# Patient Record
Sex: FEMALE | Race: WHITE | NOT HISPANIC OR LATINO | Employment: OTHER | URBAN - METROPOLITAN AREA
[De-identification: names, ages, dates, MRNs, and addresses within clinical notes are randomized per-mention and may not be internally consistent; named-entity substitution may affect disease eponyms.]

---

## 2022-02-22 ENCOUNTER — TRANSCRIBE ORDERS (OUTPATIENT)
Dept: ADMINISTRATIVE | Facility: HOSPITAL | Age: 67
End: 2022-02-22

## 2022-02-22 DIAGNOSIS — C50.011 MALIGNANT NEOPLASM OF NIPPLE OF RIGHT BREAST IN FEMALE, UNSPECIFIED ESTROGEN RECEPTOR STATUS: Primary | ICD-10-CM

## 2022-03-14 ENCOUNTER — APPOINTMENT (OUTPATIENT)
Dept: OTHER | Facility: HOSPITAL | Age: 67
End: 2022-03-14
Payer: MEDICARE

## 2022-03-14 ENCOUNTER — HOSPITAL ENCOUNTER (OUTPATIENT)
Dept: MRI IMAGING | Facility: HOSPITAL | Age: 67
Discharge: HOME | End: 2022-03-14
Payer: MEDICARE

## 2022-03-14 DIAGNOSIS — Z09 FOLLOW UP: ICD-10-CM

## 2022-03-14 DIAGNOSIS — C50.011 MALIGNANT NEOPLASM OF NIPPLE OF RIGHT BREAST IN FEMALE, UNSPECIFIED ESTROGEN RECEPTOR STATUS: ICD-10-CM

## 2022-03-14 LAB
CREAT BLDA-MCNC: 0.7 MG/DL (ref 0.6–1.3)
EGFRCR SERPLBLD CKD-EPI 2021: 96.1 ML/MIN/1.73

## 2022-03-14 PROCEDURE — A9579 GAD-BASE MR CONTRAST NOS,1ML: HCPCS | Performed by: NURSE PRACTITIONER

## 2022-03-14 PROCEDURE — 77049 MRI BREAST C-+ W/CAD BI: CPT

## 2022-03-14 PROCEDURE — 25010000002 GADOTERIDOL PER 1 ML: Performed by: NURSE PRACTITIONER

## 2022-03-14 PROCEDURE — 82565 ASSAY OF CREATININE: CPT

## 2022-03-14 RX ADMIN — GADOTERIDOL 20 ML: 279.3 INJECTION, SOLUTION INTRAVENOUS at 12:21

## 2022-12-15 ENCOUNTER — HOSPITAL ENCOUNTER (OUTPATIENT)
Facility: HOSPITAL | Age: 67
Discharge: HOME | End: 2022-12-15
Attending: EMERGENCY MEDICINE | Admitting: EMERGENCY MEDICINE
Payer: MEDICARE

## 2022-12-15 VITALS
TEMPERATURE: 98.1 F | SYSTOLIC BLOOD PRESSURE: 149 MMHG | HEIGHT: 66 IN | RESPIRATION RATE: 20 BRPM | WEIGHT: 230 LBS | BODY MASS INDEX: 36.96 KG/M2 | DIASTOLIC BLOOD PRESSURE: 84 MMHG | HEART RATE: 95 BPM | OXYGEN SATURATION: 96 %

## 2022-12-15 DIAGNOSIS — J06.9 VIRAL UPPER RESPIRATORY INFECTION: Primary | ICD-10-CM

## 2022-12-15 LAB
FLUAV SUBTYP SPEC NAA+PROBE: NOT DETECTED
FLUBV RNA ISLT QL NAA+PROBE: NOT DETECTED
SARS-COV-2 RNA RESP QL NAA+PROBE: NOT DETECTED

## 2022-12-15 PROCEDURE — 99203 OFFICE O/P NEW LOW 30 MIN: CPT | Performed by: NURSE PRACTITIONER

## 2022-12-15 PROCEDURE — 99283 EMERGENCY DEPT VISIT LOW MDM: CPT

## 2022-12-15 PROCEDURE — 87636 SARSCOV2 & INF A&B AMP PRB: CPT | Performed by: EMERGENCY MEDICINE

## 2022-12-15 PROCEDURE — G0463 HOSPITAL OUTPT CLINIC VISIT: HCPCS | Performed by: NURSE PRACTITIONER

## 2022-12-15 RX ORDER — BENZONATATE 200 MG/1
200 CAPSULE ORAL 3 TIMES DAILY PRN
Qty: 15 CAPSULE | Refills: 0 | Status: SHIPPED | OUTPATIENT
Start: 2022-12-15

## 2022-12-15 NOTE — DISCHARGE INSTRUCTIONS
Thank you for letting us care for you today.  You have tested negative for COVID and flu.  You have been given a prescription medication for your cough which you may take following package instructions.   Should you experience significant shortness of breath, chest pain, dizziness, inability to care for yourself, please seek medical care.

## 2022-12-15 NOTE — FSED PROVIDER NOTE
EMERGENCY DEPARTMENT ENCOUNTER      Room Number: 11/11    History is provided by the patient, no translation services needed    HPI:    Chief complaint: Rhinorrhea, cough, congestion, headache    Location: Generalized    Quality/Severity: Moderate severity    Timing/Duration: Onset was Monday and has continued    Modifying Factors: She has been taking over-the-counter cold and cough medication with minimal relief in her symptoms    Associated Symptoms: Multiple coworkers have been sick with the flu    Narrative: Pt is a 67 y.o. female who presents complaining of rhinorrhea, cough, congestion, headache that began on Monday.  She has been taking over-the-counter medication for her symptoms with no relief.  She is concerned that she may have COVID or flu.  She is alert, oriented, no acute distress.      PMD: Tyra Garcia MD    REVIEW OF SYSTEMS  Review of Systems   Constitutional: Positive for chills, fatigue and fever. Negative for activity change, appetite change, diaphoresis and unexpected weight change.   HENT: Positive for congestion, postnasal drip and rhinorrhea. Negative for facial swelling, nosebleeds, sinus pressure, sinus pain, sneezing and sore throat.    Eyes: Negative for photophobia and visual disturbance.   Respiratory: Positive for cough. Negative for chest tightness and shortness of breath.    Cardiovascular: Negative for chest pain, palpitations and leg swelling.   Gastrointestinal: Negative for abdominal pain.   Genitourinary: Negative for dysuria, flank pain and frequency.   Musculoskeletal: Positive for myalgias.   Skin: Negative for color change, pallor, rash and wound.   Allergic/Immunologic: Negative for immunocompromised state.   Neurological: Positive for headaches. Negative for dizziness and facial asymmetry.   Hematological: Negative for adenopathy. Does not bruise/bleed easily.         PAST MEDICAL HISTORY  Active Ambulatory Problems     Diagnosis Date Noted   • No Active Ambulatory  Problems     Resolved Ambulatory Problems     Diagnosis Date Noted   • No Resolved Ambulatory Problems     Past Medical History:   Diagnosis Date   • Depression    • Diabetes mellitus (HCC)    • Hypertension        PAST SURGICAL HISTORY  History reviewed. No pertinent surgical history.    FAMILY HISTORY  History reviewed. No pertinent family history.    SOCIAL HISTORY  Social History     Socioeconomic History   • Marital status: Single   Tobacco Use   • Smoking status: Never   Vaping Use   • Vaping Use: Never used   Substance and Sexual Activity   • Alcohol use: Never   • Drug use: Never       ALLERGIES  Contrast dye    No current facility-administered medications for this encounter.    Current Outpatient Medications:   •  benzonatate (TESSALON) 200 MG capsule, Take 1 capsule by mouth 3 (Three) Times a Day As Needed for Cough., Disp: 15 capsule, Rfl: 0    PHYSICAL EXAM  ED Triage Vitals [12/15/22 1212]   Temp Heart Rate Resp BP SpO2   98.1 °F (36.7 °C) 95 20 149/84 96 %      Temp src Heart Rate Source Patient Position BP Location FiO2 (%)   Oral Monitor Sitting Left arm --       Physical Exam  Constitutional:       General: She is not in acute distress.     Appearance: Normal appearance. She is normal weight. She is not ill-appearing, toxic-appearing or diaphoretic.   HENT:      Head: Normocephalic and atraumatic.      Right Ear: Tympanic membrane, ear canal and external ear normal. There is no impacted cerumen.      Left Ear: Tympanic membrane, ear canal and external ear normal. There is no impacted cerumen.      Nose: Congestion and rhinorrhea present.      Mouth/Throat:      Pharynx: No oropharyngeal exudate or posterior oropharyngeal erythema.   Eyes:      Extraocular Movements: Extraocular movements intact.      Conjunctiva/sclera: Conjunctivae normal.   Cardiovascular:      Rate and Rhythm: Normal rate and regular rhythm.      Pulses: Normal pulses.   Pulmonary:      Effort: Pulmonary effort is normal. No  respiratory distress.      Breath sounds: Normal breath sounds. No stridor. No wheezing, rhonchi or rales.   Musculoskeletal:         General: Normal range of motion.      Cervical back: Normal range of motion and neck supple.   Lymphadenopathy:      Cervical: No cervical adenopathy.   Skin:     General: Skin is warm and dry.      Capillary Refill: Capillary refill takes less than 2 seconds.      Coloration: Skin is not jaundiced or pale.      Findings: No bruising or erythema.   Neurological:      General: No focal deficit present.      Mental Status: She is alert and oriented to person, place, and time.   Psychiatric:         Mood and Affect: Mood normal.         Behavior: Behavior normal.           LAB RESULTS  Lab Results (last 24 hours)     Procedure Component Value Units Date/Time    COVID-19 and FLU A/B PCR - Swab, Nasopharynx [[id number]]  (Normal) Collected: 12/15/22 1215    Specimen: Swab from Nasopharynx Updated: 12/15/22 1244     COVID19 Not Detected     Influenza A PCR Not Detected     Influenza B PCR Not Detected    Narrative:      Fact sheet for providers: https://www.fda.gov/media/895307/download    Fact sheet for patients: https://www.fda.gov/media/217136/download    Test performed by PCR.            I ordered the above labs and reviewed the results    RADIOLOGY  No Radiology Exams Resulted Within Past 24 Hours    I ordered the above radiologic testing and reviewed the results    PROCEDURES  Procedures      PROGRESS AND CONSULTS  ED Course as of 12/15/22 1257   Thu Dec 15, 2022   1254 COVID and flu are both negative [VT]   1257 Negative findings discussed with patient.  She is to treat her symptoms with over-the-counter medication such as Coricidin HBP. [VT]      ED Course User Index  [VT] Miriam Oakes, [place]           MEDICAL DECISION MAKING    [place]     Thank you for letting us care for you today.  You have tested negative for COVID and flu.  You have been given a prescription medication for  your cough which you may take following package instructions.   Should you experience significant shortness of breath, chest pain, dizziness, inability to care for yourself, please seek medical care.  DIAGNOSIS  Final diagnoses:   Viral upper respiratory infection       Latest Documented Vital Signs:  As of 12:57 EST  BP- 149/84 HR- 95 Temp- 98.1 °F (36.7 °C) (Oral) O2 sat- 96%    DISPOSITION    Discussed pertinent findings with the patient/family.  Patient/Family voiced understanding of need to follow-up for recheck and further testing as needed.  Return to the Emergency Department warnings were given.         Medication List      New Prescriptions    benzonatate 200 MG capsule  Commonly known as: TESSALON  Take 1 capsule by mouth 3 (Three) Times a Day As Needed for Cough.           Where to Get Your Medications      These medications were sent to [id number] - [place], [place] [address] AT [place] [address] - [phone]  - [phone] [address] [place] [address]    Phone: [phone]   · benzonatate 200 MG capsule              Follow-up Information     Tyra Garcia MD. Call in 3 days.    Specialty: Family Medicine  Why: As needed, If symptoms worsen  Contact information:  [place] [address] [phone]                           Dictated utilizing Dragon dictation

## 2023-06-13 ENCOUNTER — APPOINTMENT (OUTPATIENT)
Dept: GENERAL RADIOLOGY | Facility: HOSPITAL | Age: 68
End: 2023-06-13
Payer: MEDICARE

## 2023-06-13 ENCOUNTER — HOSPITAL ENCOUNTER (OUTPATIENT)
Facility: HOSPITAL | Age: 68
Discharge: HOME | End: 2023-06-13
Attending: PEDIATRICS | Admitting: PEDIATRICS
Payer: MEDICARE

## 2023-06-13 VITALS
HEART RATE: 86 BPM | OXYGEN SATURATION: 95 % | DIASTOLIC BLOOD PRESSURE: 61 MMHG | TEMPERATURE: 98.3 F | RESPIRATION RATE: 18 BRPM | HEIGHT: 66 IN | SYSTOLIC BLOOD PRESSURE: 149 MMHG | WEIGHT: 233 LBS | BODY MASS INDEX: 37.45 KG/M2

## 2023-06-13 DIAGNOSIS — S22.31XA CLOSED FRACTURE OF ONE RIB OF RIGHT SIDE, INITIAL ENCOUNTER: Primary | ICD-10-CM

## 2023-06-13 PROCEDURE — G0463 HOSPITAL OUTPT CLINIC VISIT: HCPCS

## 2023-06-13 PROCEDURE — 73562 X-RAY EXAM OF KNEE 3: CPT

## 2023-06-13 PROCEDURE — 71101 X-RAY EXAM UNILAT RIBS/CHEST: CPT

## 2023-06-13 RX ORDER — PAROXETINE HYDROCHLORIDE 40 MG/1
40 TABLET, FILM COATED ORAL DAILY
COMMUNITY
Start: 2023-03-28

## 2023-06-13 RX ORDER — ATORVASTATIN CALCIUM 20 MG/1
1 TABLET, FILM COATED ORAL NIGHTLY
COMMUNITY
Start: 2023-03-14

## 2023-06-13 RX ORDER — GLIMEPIRIDE 1 MG/1
1 TABLET ORAL EVERY MORNING
COMMUNITY
Start: 2023-05-17

## 2023-06-13 RX ORDER — ANASTROZOLE 1 MG/1
1 TABLET ORAL DAILY
COMMUNITY
Start: 2021-12-16

## 2023-06-13 RX ORDER — BUPROPION HYDROCHLORIDE 150 MG/1
1 TABLET, EXTENDED RELEASE ORAL DAILY
COMMUNITY
Start: 2023-03-24

## 2023-06-13 RX ORDER — VERAPAMIL HYDROCHLORIDE 120 MG/1
TABLET, FILM COATED ORAL
COMMUNITY

## 2023-06-13 RX ORDER — TRIAMTERENE AND HYDROCHLOROTHIAZIDE 37.5; 25 MG/1; MG/1
1 TABLET ORAL DAILY
COMMUNITY
Start: 2023-03-14

## 2023-06-13 NOTE — DISCHARGE INSTRUCTIONS
Use your incentive spirometer as we discussed to prevent the development of pneumonia    Recommend following up with your primary care provider in 1 week    If you develop shortness of breath or chest pain return to ER    Recommend that you continue to use the Ace wrap to your right leg to help reduce swelling, recommend that you elevate and apply cold compresses to the right leg for the next 24 hours.    For all worsening conditions return to ER.

## 2023-06-13 NOTE — FSED PROVIDER NOTE
Subjective   History of Present Illness  68-year-old female reports getting out of the shower this morning around 1030 or 11:00 slipping and falling on her right side.  She reports noticing an area of swelling to the right side of her knee that is near a varicose vein that she has had for a considerable amount of time.  She was concerned that she could have had an injury to this part of her leg.  She also reports tenderness to her right rib cage.  She denies hitting her head or loss of consciousness.  Her daughter who is at bedside is concerned the patient could have a urinary tract infection is requesting we checked her urine patient is denying all urinary type symptoms.  Patient is denying chest pain shortness of breath nausea vomiting diarrhea and fever.  She does report that her balance seems to be off over the last few days and has contributed to 2 other falls while she was out shopping.  With these 2 of the falls she denies any specific injury or loss of consciousness.  She attributes her falling to a lack of seeing where she is walking or tripping on unidentified object        Review of Systems   All other systems reviewed and are negative.      Past Medical History:   Diagnosis Date   • Depression    • Diabetes mellitus    • Hypertension        Allergies   Allergen Reactions   • Contrast Dye (Echo Or Unknown Ct/Mr) Rash       History reviewed. No pertinent surgical history.    History reviewed. No pertinent family history.    Social History     Socioeconomic History   • Marital status: Single   Tobacco Use   • Smoking status: Never   Vaping Use   • Vaping Use: Never used   Substance and Sexual Activity   • Alcohol use: Never   • Drug use: Never           Objective   Physical Exam  Constitutional:       Appearance: Normal appearance.   HENT:      Head: Normocephalic and atraumatic.      Nose: Nose normal.      Mouth/Throat:      Mouth: Mucous membranes are moist.      Pharynx: Oropharynx is clear.   Eyes:       Extraocular Movements: Extraocular movements intact.      Pupils: Pupils are equal, round, and reactive to light.   Cardiovascular:      Pulses: Normal pulses.      Heart sounds: Normal heart sounds.   Pulmonary:      Effort: Pulmonary effort is normal.      Breath sounds: Normal breath sounds.   Abdominal:      General: Abdomen is flat.      Palpations: Abdomen is soft.   Musculoskeletal:         General: Swelling (Soft tissue swelling noted to the right lateral aspect of the right leg just below the patella 3 x 5 cm area no ecchymosis or bruising or signs of obvious bleed) and tenderness (Tenderness with palpation to the right anterior rib cage just below right breast) present.      Cervical back: Normal range of motion and neck supple.   Skin:     General: Skin is warm and dry.   Neurological:      General: No focal deficit present.      Mental Status: She is alert and oriented to person, place, and time.         Procedures           ED Course  ED Course as of 06/13/23 1807 Tue Jun 13, 2023   1538 Vital signs are normal as interpreted by me [WF]   1538 X-ray of the right knee 3 views:IMPRESSION:  1.No acute fractures or dislocations. [WF]   1542 X-ray right ribs IMPRESSION:     Minimally displaced fracture of the anterior aspect of the right seventh rib without pneumothorax. [WF]      ED Course User Index  [WF] Shade Lindsay Jr., APRN                                           Medical Decision Making  We will x-ray right knee and right rib cage.  We will also check urinalysis.    Patient attempted to give us a urine sample but inadvertently spilled urine in the floor.  Again she is not having any symptoms of UTI.  The check for urinalysis was at the request of her daughter.  Patient does have 1 right-sided rib fracture.  She has been given an incentive spirometer and instructed on its use.  Will discharge at this time    Amount and/or Complexity of Data Reviewed  Radiology: ordered.          Final  diagnoses:   Closed fracture of one rib of right side, initial encounter       ED Disposition  ED Disposition     ED Disposition   Discharge    Condition   Stable    Comment   --             Nirali Johns MD  [address] [facility]  [address] [place] [id number] [phone]      As needed         Medication List      No changes were made to your prescriptions during this visit.